# Patient Record
Sex: FEMALE | Race: ASIAN | Employment: UNEMPLOYED
[De-identification: names, ages, dates, MRNs, and addresses within clinical notes are randomized per-mention and may not be internally consistent; named-entity substitution may affect disease eponyms.]

---

## 2022-04-04 ENCOUNTER — TRANSCRIBE ORDERS (OUTPATIENT)
Dept: OTHER | Age: 57
End: 2022-04-04

## 2022-04-04 DIAGNOSIS — G44.209 TENSION-TYPE HEADACHE, NOT INTRACTABLE, UNSPECIFIED CHRONICITY PATTERN: Primary | ICD-10-CM

## 2022-04-15 ENCOUNTER — THERAPY VISIT (OUTPATIENT)
Dept: PHYSICAL THERAPY | Facility: CLINIC | Age: 57
End: 2022-04-15
Attending: FAMILY MEDICINE
Payer: COMMERCIAL

## 2022-04-15 DIAGNOSIS — M54.2 NECK PAIN: ICD-10-CM

## 2022-04-15 DIAGNOSIS — G44.86 CERVICOGENIC HEADACHE: ICD-10-CM

## 2022-04-15 PROCEDURE — 97110 THERAPEUTIC EXERCISES: CPT | Mod: GP | Performed by: PHYSICAL THERAPIST

## 2022-04-15 PROCEDURE — 97140 MANUAL THERAPY 1/> REGIONS: CPT | Mod: GP | Performed by: PHYSICAL THERAPIST

## 2022-04-15 PROCEDURE — 97161 PT EVAL LOW COMPLEX 20 MIN: CPT | Mod: GP | Performed by: PHYSICAL THERAPIST

## 2022-04-15 NOTE — PROGRESS NOTES
[facility]    OUTPATIENT Physical Therapy ORTHOPEDIC EVALUATION  PLAN OF TREATMENT FOR OUTPATIENT REHABILITATION  (COMPLETE FOR INITIAL CLAIMS ONLY)  Patient's Last Name, First Name, M.I.  YOB: 1965  LutherBeverly  FABI    Provider s Name:  [facility]   Medical Record No.  [id number]   Start of Care Date:  04/15/22   Onset Date:   02/15/22   Type:     _X__PT   ___OT Medical Diagnosis:    Encounter Diagnoses   Name Primary?    Neck pain     Cervicogenic headache         Treatment Diagnosis:  Neck pain with head ache        Goals:     04/15/22 0500   Body Part   Goals listed below are for Neck pain with headaches   Goal #1   Goal #1 headaches   Previous Functional Level No headaches   Performance Level Headaches everyday of the week PL 5/10   STG Target Performance Reduce frequency of headaches in a week to   Performance Level 3-4x/week with PL 2-3/10   Rationale to improve quality of life and resume normal social activities;to establish restorative sleep pattern   Due Date 05/06/22   LTG Target Performance Reduce frequency of headaches in a month to   Performance Level 2 a month with PL 2-3/10   Rationale to improve quality of life and resume normal social activities;to establish restorative sleep pattern   Due Date 06/10/22       Therapy Frequency:  1x/week for 8 weeks  Predicted Duration of Therapy Intervention:  8 weeks    Madelaine Cook, PT                 I CERTIFY THE NEED FOR THESE SERVICES FURNISHED UNDER        THIS PLAN OF TREATMENT AND WHILE UNDER MY CARE .             Physician Signature               Date    X_____________________________________________________                         Certification Date From:  04/15/22   Certification Date To:  06/10/22    Referring Provider:  Treva Marti    Initial Assessment        See Epic Evaluation SOC  Date: 04/15/22

## 2022-04-15 NOTE — PROGRESS NOTES
Physical Therapy Initial Evaluation  Subjective:  A  was used ([employer]).   Patient Health History  Beverly Sloan being seen for Neck pain with headache .     Problem began: 2/15/2022.   Problem occurred: Pt reports insidious onset of headaches while at home, most aggravatived with use of phone. Pain is persisting and gradually worsening over time.    Pain is reported as 3/10 (5/10 at worst pain ) on pain scale.    Pertinent medical history includes: diabetes.   Red flags:  None as reported by patient.         Current medications: None as reported by patient.    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                     Therapist Generated HPI Evaluation         Type of problem:  Cervical spine.    This is a new (2 months) condition.  Condition occurred with:  Repetition/overuse.  Where condition occurred: at home (Doing housework at home, watching tv and using phone).  Patient reports pain:  Cervical left side, lower cervical spine and cervical right side.  Pain is described as aching   Pain radiates to:  Head, shoulder left and shoulder right. Pain is worse in the P.M. (Pain is everyday and starts at 5pm).  Since onset symptoms are gradually worsening.  Associated symptoms:  Headache and visual disturbances. Symptoms are exacerbated by certain positions (Working )  and relieved by activity/movement, other, rest and NSAID's (Massaging).  Imaging testing: None     Restrictions due to condition include:  Working in normal job without restrictions.  Barriers include:  None as reported by patient (Lives with  who reports pt in often in L sidyling for prolonged periods while on phone).                        Objective:  System              Cervical/Thoracic Evaluation    AROM:  AROM Cervical:    Flexion:            WFL, discomfort L   Extension:       WFL  Rotation:         Left: WFL, discomfort L side     Right: WFL,discomfort L side   Side Bend:      Left: WFL, discomfort L  side      Right:  WFL, discomfort L side       Headaches: cervical          Cervical Palpation:  : TTP at insertion of SCM.  Tenderness present at Left:    Sternocleidomstoid; Upper Trap and Suboccipitals  Tenderness present at Right:    Sternocleidomstoid; Upper Trap and Suboccipitals                                                  Clarence Cervical Evaluation    Posture:  Sitting: poor  Standing: poor  Protruding Head: yes  Wry Neck: no  Correction of Posture: better    Movement Loss:  Protrusion (PRO): nil  Flexion (Flex): nil  Retraction (RET): min  Extension (EXT): min  Lateral Flexion Right (LF R): min  Lateral Flexion Left (LF L): min  Rotation Right (ROT R): min  Rotation Left (ROT L): min  Test Movements:  Pretest Pain Sitting: B pain to temples and suboccipital region    RET: During: no effect  After: no effect  Mechanical Response: IncROM  Repeat RET: During: no effect  After: no effect  Mechanical Response: IncROM                          Conclusion: posture                                           ROS    Assessment/Plan:    Patient is a 56 year old female with cervical complaints.    Patient has the following significant findings with corresponding treatment plan.                Diagnosis 1:  Neck pain with head ache      Pain -  manual therapy, self management, education, directional preference exercise and home program  Decreased ROM/flexibility - manual therapy, therapeutic exercise, therapeutic activity and home program  Decreased joint mobility - manual therapy, therapeutic exercise, therapeutic activity and home program  Decreased function - therapeutic activities and home program  Impaired posture - neuro re-education, therapeutic activities and home program    Therapy Evaluation Codes:   Cumulative Therapy Evaluation is: Low complexity.    Previous and current functional limitations:  (See Goal Flow Sheet for this information)    Short term and Long term goals: (See Goal Flow Sheet for this  information)     Communication ability:  Patient appears to be able to clearly communicate and understand verbal and written communication and follow directions correctly.  Treatment Explanation - The following has been discussed with the patient:   RX ordered/plan of care  Anticipated outcomes  Possible risks and side effects  This patient would benefit from PT intervention to resume normal activities.   Rehab potential is good.    Frequency:  1 X week, once daily  Duration:  for 4 weeks tapering to 2X a month over 8 weeks  Discharge Plan:  Achieve all LTG.  Independent in home treatment program.  Return to previous functional level by discharge.  Reach maximal therapeutic benefit.    Please refer to the daily flowsheet for treatment today, total treatment time and time spent performing 1:1 timed codes.

## 2022-04-22 ENCOUNTER — THERAPY VISIT (OUTPATIENT)
Dept: PHYSICAL THERAPY | Facility: CLINIC | Age: 57
End: 2022-04-22
Attending: FAMILY MEDICINE
Payer: COMMERCIAL

## 2022-04-22 DIAGNOSIS — M54.2 CERVICALGIA: Primary | ICD-10-CM

## 2022-04-22 DIAGNOSIS — G44.86 CERVICOGENIC HEADACHE: ICD-10-CM

## 2022-04-22 PROCEDURE — 97110 THERAPEUTIC EXERCISES: CPT | Mod: GP | Performed by: PHYSICAL THERAPIST

## 2022-04-22 PROCEDURE — 97112 NEUROMUSCULAR REEDUCATION: CPT | Mod: GP | Performed by: PHYSICAL THERAPIST

## 2022-04-22 PROCEDURE — 97140 MANUAL THERAPY 1/> REGIONS: CPT | Mod: GP | Performed by: PHYSICAL THERAPIST

## 2022-04-29 ENCOUNTER — THERAPY VISIT (OUTPATIENT)
Dept: PHYSICAL THERAPY | Facility: CLINIC | Age: 57
End: 2022-04-29
Attending: FAMILY MEDICINE
Payer: COMMERCIAL

## 2022-04-29 DIAGNOSIS — M54.2 CERVICALGIA: Primary | ICD-10-CM

## 2022-04-29 DIAGNOSIS — G44.86 CERVICOGENIC HEADACHE: ICD-10-CM

## 2022-04-29 PROCEDURE — 97112 NEUROMUSCULAR REEDUCATION: CPT | Mod: GP | Performed by: PHYSICAL THERAPIST

## 2022-04-29 PROCEDURE — 97140 MANUAL THERAPY 1/> REGIONS: CPT | Mod: GP | Performed by: PHYSICAL THERAPIST

## 2022-04-29 PROCEDURE — 97110 THERAPEUTIC EXERCISES: CPT | Mod: GP | Performed by: PHYSICAL THERAPIST

## 2022-05-20 ENCOUNTER — THERAPY VISIT (OUTPATIENT)
Dept: PHYSICAL THERAPY | Facility: CLINIC | Age: 57
End: 2022-05-20
Payer: COMMERCIAL

## 2022-05-20 DIAGNOSIS — G44.86 CERVICOGENIC HEADACHE: ICD-10-CM

## 2022-05-20 DIAGNOSIS — M54.2 CERVICALGIA: Primary | ICD-10-CM

## 2022-05-20 PROCEDURE — 97110 THERAPEUTIC EXERCISES: CPT | Mod: GP | Performed by: PHYSICAL THERAPIST

## 2022-05-20 PROCEDURE — 97140 MANUAL THERAPY 1/> REGIONS: CPT | Mod: GP | Performed by: PHYSICAL THERAPIST

## 2022-05-20 PROCEDURE — 97112 NEUROMUSCULAR REEDUCATION: CPT | Mod: GP | Performed by: PHYSICAL THERAPIST

## 2022-05-29 ENCOUNTER — HEALTH MAINTENANCE LETTER (OUTPATIENT)
Age: 57
End: 2022-05-29

## 2022-06-20 ENCOUNTER — THERAPY VISIT (OUTPATIENT)
Dept: PHYSICAL THERAPY | Facility: CLINIC | Age: 57
End: 2022-06-20
Payer: COMMERCIAL

## 2022-06-20 DIAGNOSIS — M54.2 CERVICALGIA: Primary | ICD-10-CM

## 2022-06-20 DIAGNOSIS — G44.86 CERVICOGENIC HEADACHE: ICD-10-CM

## 2022-06-20 PROCEDURE — 97140 MANUAL THERAPY 1/> REGIONS: CPT | Mod: GP | Performed by: PHYSICAL THERAPIST

## 2022-06-20 PROCEDURE — 97110 THERAPEUTIC EXERCISES: CPT | Mod: GP | Performed by: PHYSICAL THERAPIST

## 2022-06-20 PROCEDURE — 97112 NEUROMUSCULAR REEDUCATION: CPT | Mod: GP | Performed by: PHYSICAL THERAPIST

## 2022-06-21 NOTE — PROGRESS NOTES
[facility]    OUTPATIENT Physical Therapy ORTHOPEDIC EVALUATION  PLAN OF TREATMENT FOR OUTPATIENT REHABILITATION  (COMPLETE FOR INITIAL CLAIMS ONLY)  Patient's Last Name, First Name, M.I.  YOB: 1965  Beverly Sloan    Provider s Name:  [facility]   Medical Record No.  [id number]   Start of Care Date:  04/15/22   Onset Date:   02/15/22   Type:     _X__PT   ___OT Medical Diagnosis:    Encounter Diagnoses   Name Primary?    Cervicalgia Yes    Cervicogenic headache         Treatment Diagnosis:  Neck pain with head ache        Goals:     06/20/22 0500   Body Part   Goals listed below are for Neck pain with headaches   Goal #1   Goal #1 headaches   Previous Functional Level No headaches   Current Functional Level Headache frequency per week is   Performance Level 2x/ month pl 2/10   STG Target Performance Reduce frequency of headaches in a week to   Performance Level 1x/ month   Rationale to improve quality of life and resume normal social activities;to establish restorative sleep pattern   Due Date 08/01/22   If goal not met, Why? had 2 headaches over the last month decrease overall with frequency and intensity.   LTG Target Performance Reduce frequency of headaches in a month to   Performance Level 0/ month   Rationale to improve quality of life and resume normal social activities;to establish restorative sleep pattern   Due Date 09/09/22       Therapy Frequency:  1x/week for 8 weeks  Predicted Duration of Therapy Intervention:  8 weeks    Treva Bradshaw, PT                 I CERTIFY THE NEED FOR THESE SERVICES FURNISHED UNDER        THIS PLAN OF TREATMENT AND WHILE UNDER MY CARE .             Physician Signature               Date    X_____________________________________________________                         Certification Date From:  06/11/22   Certification  Date To:  09/09/22    Referring Provider:  Treva Marti    Initial Assessment        See Epic Evaluation SOC Date: 04/15/22

## 2022-06-21 NOTE — PROGRESS NOTES
Subjective:  HPI  Physical Exam                    Objective:  System    Physical Exam    General     ROS    Assessment/Plan:    PROGRESS  REPORT    Progress reporting period is from 4/15/2022 to 6/20/2022.       SUBJECTIVE  Subjective changes noted by patient:   I have had 2 headaches over the last 4 weeks.  I am doing most of my daily activity.  When I am lifting heavy laundry I have some upper neck soreness .  I only had the pain only on Friday, and Saturday     Current Pain level: 0/10.      Initial Pain level: 5/10.   Changes in function:  Yes (See Goal flowsheet attached for changes in current functional level)  Adverse reaction to treatment or activity: None    OBJECTIVE  Changes noted in objective findings:  Yes,  CROM flexion WFL, extension moderate, rotation moderate, SB moderate.  Tightness in left UT, levator, scalenes, C1-C2 rotation.  Tightness in thoracic vertebra movement.  Increase awareness of posture and head position.  Has returned to health club.     ASSESSMENT/PLAN  Updated problem list and treatment plan: Diagnosis 1:  Neck pain with head ache  Pain -  manual therapy, self management, education, directional preference exercise and home program  Decreased ROM/flexibility - manual therapy, therapeutic exercise, therapeutic activity and home program  Decreased joint mobility - manual therapy, therapeutic exercise, therapeutic activity and home program  Impaired muscle performance - neuro re-education and home program  Decreased function - therapeutic activities and home program  Impaired posture - neuro re-education, therapeutic activities and home program  STG/LTGs have been met or progress has been made towards goals:  Yes (See Goal flow sheet completed today.)  Assessment of Progress: The patient's condition is improving.  The patient's condition has potential to improve.  Self Management Plans:  Patient has been instructed in a home treatment program.  Patient  has been instructed in self  management of symptoms.    Recommendations:  This patient would benefit from continued therapy.     Frequency:  1 X week, once daily  Duration:  Every 4 weeks for 12 weeks     Patient continues to show improvement with decrease in tightness, headaches and increase in movement.  Patient does her exercises at home and has now returned to health club.  Patient would benefit from continual stretching, progression with exercises and functional activity.  Patient needs to have daughter provide transportation.  Thank you for the opportunity of working with this motivated individual.      Please refer to the daily flowsheet for treatment today, total treatment time and time spent performing 1:1 timed codes.

## 2022-08-29 PROBLEM — M54.2 CERVICALGIA: Status: RESOLVED | Noted: 2022-04-15 | Resolved: 2022-08-29

## 2022-08-29 PROBLEM — G44.86 CERVICOGENIC HEADACHE: Status: RESOLVED | Noted: 2022-04-15 | Resolved: 2022-08-29

## 2022-10-03 ENCOUNTER — HEALTH MAINTENANCE LETTER (OUTPATIENT)
Age: 57
End: 2022-10-03

## 2022-12-14 ENCOUNTER — TRANSCRIBE ORDERS (OUTPATIENT)
Dept: OTHER | Age: 57
End: 2022-12-14

## 2022-12-14 DIAGNOSIS — M54.2 NECK PAIN: Primary | ICD-10-CM

## 2023-02-12 ENCOUNTER — HEALTH MAINTENANCE LETTER (OUTPATIENT)
Age: 58
End: 2023-02-12

## 2024-03-10 ENCOUNTER — HEALTH MAINTENANCE LETTER (OUTPATIENT)
Age: 59
End: 2024-03-10

## 2024-07-28 ENCOUNTER — HEALTH MAINTENANCE LETTER (OUTPATIENT)
Age: 59
End: 2024-07-28

## 2025-03-16 ENCOUNTER — HEALTH MAINTENANCE LETTER (OUTPATIENT)
Age: 60
End: 2025-03-16